# Patient Record
Sex: MALE | Race: WHITE | NOT HISPANIC OR LATINO
[De-identification: names, ages, dates, MRNs, and addresses within clinical notes are randomized per-mention and may not be internally consistent; named-entity substitution may affect disease eponyms.]

---

## 2023-05-03 PROBLEM — Z00.00 ENCOUNTER FOR PREVENTIVE HEALTH EXAMINATION: Status: ACTIVE | Noted: 2023-05-03

## 2023-05-04 ENCOUNTER — APPOINTMENT (OUTPATIENT)
Dept: OTOLARYNGOLOGY | Facility: CLINIC | Age: 73
End: 2023-05-04
Payer: MEDICARE

## 2023-05-04 VITALS — HEIGHT: 74 IN | BODY MASS INDEX: 21.82 KG/M2 | WEIGHT: 170 LBS

## 2023-05-04 DIAGNOSIS — Z90.49 ACQUIRED ABSENCE OF OTHER SPECIFIED PARTS OF DIGESTIVE TRACT: ICD-10-CM

## 2023-05-04 PROCEDURE — 99203 OFFICE O/P NEW LOW 30 MIN: CPT

## 2023-05-04 NOTE — ASSESSMENT
[FreeTextEntry1] : Clinically stable.\par No further work-up or treatment required at this time.\par Follow-up as needed.

## 2023-05-04 NOTE — HISTORY OF PRESENT ILLNESS
[de-identified] : I have not seen this gentleman in approximately 7 years.\par At that time he underwent a right  partial superficial parotidectomy.  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

## 2025-03-04 ENCOUNTER — APPOINTMENT (OUTPATIENT)
Dept: AFTER HOURS CARE | Facility: EMERGENCY ROOM | Age: 75
End: 2025-03-04
Payer: MEDICARE

## 2025-03-04 PROCEDURE — 99203 OFFICE O/P NEW LOW 30 MIN: CPT | Mod: 2W
